# Patient Record
Sex: FEMALE | Race: WHITE | NOT HISPANIC OR LATINO | Employment: FULL TIME | URBAN - METROPOLITAN AREA
[De-identification: names, ages, dates, MRNs, and addresses within clinical notes are randomized per-mention and may not be internally consistent; named-entity substitution may affect disease eponyms.]

---

## 2019-08-27 ENCOUNTER — APPOINTMENT (OUTPATIENT)
Dept: RADIOLOGY | Facility: MEDICAL CENTER | Age: 39
DRG: 389 | End: 2019-08-27
Attending: EMERGENCY MEDICINE
Payer: COMMERCIAL

## 2019-08-27 ENCOUNTER — HOSPITAL ENCOUNTER (INPATIENT)
Facility: MEDICAL CENTER | Age: 39
LOS: 1 days | DRG: 389 | End: 2019-08-28
Attending: EMERGENCY MEDICINE | Admitting: HOSPITALIST
Payer: COMMERCIAL

## 2019-08-27 DIAGNOSIS — R10.84 GENERALIZED ABDOMINAL PAIN: ICD-10-CM

## 2019-08-27 DIAGNOSIS — R11.2 NON-INTRACTABLE VOMITING WITH NAUSEA, UNSPECIFIED VOMITING TYPE: ICD-10-CM

## 2019-08-27 DIAGNOSIS — K56.609 SMALL BOWEL OBSTRUCTION (HCC): Primary | ICD-10-CM

## 2019-08-27 PROBLEM — I10 ESSENTIAL HYPERTENSION: Status: ACTIVE | Noted: 2019-08-27

## 2019-08-27 PROBLEM — Q79.60 EHLERS-DANLOS SYNDROME: Status: ACTIVE | Noted: 2019-08-27

## 2019-08-27 LAB
ALBUMIN SERPL BCP-MCNC: 4.8 G/DL (ref 3.2–4.9)
ALBUMIN/GLOB SERPL: 1.5 G/DL
ALP SERPL-CCNC: 61 U/L (ref 30–99)
ALT SERPL-CCNC: 15 U/L (ref 2–50)
ANION GAP SERPL CALC-SCNC: 14 MMOL/L (ref 0–11.9)
AST SERPL-CCNC: 24 U/L (ref 12–45)
BASOPHILS # BLD AUTO: 0.5 % (ref 0–1.8)
BASOPHILS # BLD: 0.03 K/UL (ref 0–0.12)
BILIRUB SERPL-MCNC: 1.3 MG/DL (ref 0.1–1.5)
BUN SERPL-MCNC: 8 MG/DL (ref 8–22)
CALCIUM SERPL-MCNC: 10 MG/DL (ref 8.5–10.5)
CHLORIDE SERPL-SCNC: 97 MMOL/L (ref 96–112)
CO2 SERPL-SCNC: 25 MMOL/L (ref 20–33)
CREAT SERPL-MCNC: 0.83 MG/DL (ref 0.5–1.4)
EOSINOPHIL # BLD AUTO: 0.01 K/UL (ref 0–0.51)
EOSINOPHIL NFR BLD: 0.2 % (ref 0–6.9)
ERYTHROCYTE [DISTWIDTH] IN BLOOD BY AUTOMATED COUNT: 41.4 FL (ref 35.9–50)
GLOBULIN SER CALC-MCNC: 3.1 G/DL (ref 1.9–3.5)
GLUCOSE SERPL-MCNC: 105 MG/DL (ref 65–99)
HCG SERPL QL: NEGATIVE
HCT VFR BLD AUTO: 44.6 % (ref 37–47)
HGB BLD-MCNC: 15.2 G/DL (ref 12–16)
IMM GRANULOCYTES # BLD AUTO: 0.01 K/UL (ref 0–0.11)
IMM GRANULOCYTES NFR BLD AUTO: 0.2 % (ref 0–0.9)
LIPASE SERPL-CCNC: 10 U/L (ref 11–82)
LYMPHOCYTES # BLD AUTO: 0.99 K/UL (ref 1–4.8)
LYMPHOCYTES NFR BLD: 15.4 % (ref 22–41)
MCH RBC QN AUTO: 31.7 PG (ref 27–33)
MCHC RBC AUTO-ENTMCNC: 34.1 G/DL (ref 33.6–35)
MCV RBC AUTO: 93.1 FL (ref 81.4–97.8)
MONOCYTES # BLD AUTO: 0.37 K/UL (ref 0–0.85)
MONOCYTES NFR BLD AUTO: 5.8 % (ref 0–13.4)
NEUTROPHILS # BLD AUTO: 5.02 K/UL (ref 2–7.15)
NEUTROPHILS NFR BLD: 77.9 % (ref 44–72)
NRBC # BLD AUTO: 0 K/UL
NRBC BLD-RTO: 0 /100 WBC
PLATELET # BLD AUTO: 282 K/UL (ref 164–446)
PMV BLD AUTO: 9.5 FL (ref 9–12.9)
POTASSIUM SERPL-SCNC: 4.2 MMOL/L (ref 3.6–5.5)
PROT SERPL-MCNC: 7.9 G/DL (ref 6–8.2)
RBC # BLD AUTO: 4.79 M/UL (ref 4.2–5.4)
SODIUM SERPL-SCNC: 136 MMOL/L (ref 135–145)
WBC # BLD AUTO: 6.4 K/UL (ref 4.8–10.8)

## 2019-08-27 PROCEDURE — 96374 THER/PROPH/DIAG INJ IV PUSH: CPT

## 2019-08-27 PROCEDURE — 700105 HCHG RX REV CODE 258: Performed by: HOSPITALIST

## 2019-08-27 PROCEDURE — 84703 CHORIONIC GONADOTROPIN ASSAY: CPT

## 2019-08-27 PROCEDURE — 85025 COMPLETE CBC W/AUTO DIFF WBC: CPT

## 2019-08-27 PROCEDURE — 96375 TX/PRO/DX INJ NEW DRUG ADDON: CPT

## 2019-08-27 PROCEDURE — 700102 HCHG RX REV CODE 250 W/ 637 OVERRIDE(OP): Performed by: HOSPITALIST

## 2019-08-27 PROCEDURE — 700111 HCHG RX REV CODE 636 W/ 250 OVERRIDE (IP): Performed by: EMERGENCY MEDICINE

## 2019-08-27 PROCEDURE — 700105 HCHG RX REV CODE 258: Performed by: EMERGENCY MEDICINE

## 2019-08-27 PROCEDURE — 83690 ASSAY OF LIPASE: CPT

## 2019-08-27 PROCEDURE — 770006 HCHG ROOM/CARE - MED/SURG/GYN SEMI*

## 2019-08-27 PROCEDURE — 80053 COMPREHEN METABOLIC PANEL: CPT

## 2019-08-27 PROCEDURE — 99285 EMERGENCY DEPT VISIT HI MDM: CPT

## 2019-08-27 PROCEDURE — 74176 CT ABD & PELVIS W/O CONTRAST: CPT

## 2019-08-27 PROCEDURE — 99223 1ST HOSP IP/OBS HIGH 75: CPT | Performed by: HOSPITALIST

## 2019-08-27 PROCEDURE — 700111 HCHG RX REV CODE 636 W/ 250 OVERRIDE (IP): Performed by: HOSPITALIST

## 2019-08-27 PROCEDURE — A9270 NON-COVERED ITEM OR SERVICE: HCPCS | Performed by: HOSPITALIST

## 2019-08-27 RX ORDER — MORPHINE SULFATE 4 MG/ML
1-4 INJECTION, SOLUTION INTRAMUSCULAR; INTRAVENOUS
Status: DISCONTINUED | OUTPATIENT
Start: 2019-08-27 | End: 2019-08-28 | Stop reason: HOSPADM

## 2019-08-27 RX ORDER — SODIUM CHLORIDE, SODIUM LACTATE, POTASSIUM CHLORIDE, CALCIUM CHLORIDE 600; 310; 30; 20 MG/100ML; MG/100ML; MG/100ML; MG/100ML
INJECTION, SOLUTION INTRAVENOUS CONTINUOUS
Status: DISCONTINUED | OUTPATIENT
Start: 2019-08-27 | End: 2019-08-28

## 2019-08-27 RX ORDER — SODIUM CHLORIDE 9 MG/ML
1000 INJECTION, SOLUTION INTRAVENOUS ONCE
Status: COMPLETED | OUTPATIENT
Start: 2019-08-27 | End: 2019-08-27

## 2019-08-27 RX ORDER — AZELASTINE 1 MG/ML
1 SPRAY, METERED NASAL 2 TIMES DAILY
COMMUNITY

## 2019-08-27 RX ORDER — BISACODYL 10 MG
10 SUPPOSITORY, RECTAL RECTAL
Status: DISCONTINUED | OUTPATIENT
Start: 2019-08-27 | End: 2019-08-28 | Stop reason: HOSPADM

## 2019-08-27 RX ORDER — CLONIDINE 0.1 MG/24H
1 PATCH, EXTENDED RELEASE TRANSDERMAL
COMMUNITY

## 2019-08-27 RX ORDER — PROMETHAZINE HYDROCHLORIDE 25 MG/1
12.5-25 SUPPOSITORY RECTAL EVERY 4 HOURS PRN
Status: DISCONTINUED | OUTPATIENT
Start: 2019-08-27 | End: 2019-08-28 | Stop reason: HOSPADM

## 2019-08-27 RX ORDER — PROMETHAZINE HYDROCHLORIDE 25 MG/1
12.5-25 TABLET ORAL EVERY 4 HOURS PRN
Status: DISCONTINUED | OUTPATIENT
Start: 2019-08-27 | End: 2019-08-28 | Stop reason: HOSPADM

## 2019-08-27 RX ORDER — HYDROMORPHONE HYDROCHLORIDE 1 MG/ML
1 INJECTION, SOLUTION INTRAMUSCULAR; INTRAVENOUS; SUBCUTANEOUS ONCE
Status: COMPLETED | OUTPATIENT
Start: 2019-08-27 | End: 2019-08-27

## 2019-08-27 RX ORDER — POLYETHYLENE GLYCOL 3350 17 G/17G
1 POWDER, FOR SOLUTION ORAL
Status: DISCONTINUED | OUTPATIENT
Start: 2019-08-27 | End: 2019-08-28 | Stop reason: HOSPADM

## 2019-08-27 RX ORDER — ONDANSETRON 4 MG/1
4 TABLET, ORALLY DISINTEGRATING ORAL EVERY 4 HOURS PRN
Status: DISCONTINUED | OUTPATIENT
Start: 2019-08-27 | End: 2019-08-28 | Stop reason: HOSPADM

## 2019-08-27 RX ORDER — NALTREXONE HYDROCHLORIDE 50 MG/1
50 TABLET, FILM COATED ORAL DAILY
COMMUNITY
End: 2019-08-27

## 2019-08-27 RX ORDER — PROCHLORPERAZINE EDISYLATE 5 MG/ML
5-10 INJECTION INTRAMUSCULAR; INTRAVENOUS EVERY 4 HOURS PRN
Status: DISCONTINUED | OUTPATIENT
Start: 2019-08-27 | End: 2019-08-28 | Stop reason: HOSPADM

## 2019-08-27 RX ORDER — PROCHLORPERAZINE EDISYLATE 5 MG/ML
10 INJECTION INTRAMUSCULAR; INTRAVENOUS ONCE
Status: COMPLETED | OUTPATIENT
Start: 2019-08-27 | End: 2019-08-27

## 2019-08-27 RX ORDER — AMOXICILLIN 250 MG
2 CAPSULE ORAL 2 TIMES DAILY
Status: DISCONTINUED | OUTPATIENT
Start: 2019-08-27 | End: 2019-08-28 | Stop reason: HOSPADM

## 2019-08-27 RX ORDER — SODIUM CHLORIDE 9 MG/ML
INJECTION, SOLUTION INTRAVENOUS CONTINUOUS
Status: DISCONTINUED | OUTPATIENT
Start: 2019-08-27 | End: 2019-08-28 | Stop reason: HOSPADM

## 2019-08-27 RX ORDER — ONDANSETRON 2 MG/ML
4 INJECTION INTRAMUSCULAR; INTRAVENOUS EVERY 4 HOURS PRN
Status: DISCONTINUED | OUTPATIENT
Start: 2019-08-27 | End: 2019-08-28 | Stop reason: HOSPADM

## 2019-08-27 RX ORDER — CLONIDINE 0.1 MG/24H
1 PATCH, EXTENDED RELEASE TRANSDERMAL
Status: DISCONTINUED | OUTPATIENT
Start: 2019-08-27 | End: 2019-08-28 | Stop reason: HOSPADM

## 2019-08-27 RX ADMIN — SODIUM CHLORIDE, POTASSIUM CHLORIDE, SODIUM LACTATE AND CALCIUM CHLORIDE: 600; 310; 30; 20 INJECTION, SOLUTION INTRAVENOUS at 14:52

## 2019-08-27 RX ADMIN — PROCHLORPERAZINE EDISYLATE 10 MG: 5 INJECTION INTRAMUSCULAR; INTRAVENOUS at 08:07

## 2019-08-27 RX ADMIN — SODIUM CHLORIDE 1000 ML: 9 INJECTION, SOLUTION INTRAVENOUS at 08:07

## 2019-08-27 RX ADMIN — SODIUM CHLORIDE, POTASSIUM CHLORIDE, SODIUM LACTATE AND CALCIUM CHLORIDE: 600; 310; 30; 20 INJECTION, SOLUTION INTRAVENOUS at 17:10

## 2019-08-27 RX ADMIN — SODIUM CHLORIDE: 9 INJECTION, SOLUTION INTRAVENOUS at 12:06

## 2019-08-27 RX ADMIN — ONDANSETRON 4 MG: 2 INJECTION INTRAMUSCULAR; INTRAVENOUS at 15:03

## 2019-08-27 RX ADMIN — SENNOSIDES, DOCUSATE SODIUM 2 TABLET: 50; 8.6 TABLET, FILM COATED ORAL at 17:04

## 2019-08-27 RX ADMIN — MORPHINE SULFATE 1 MG: 4 INJECTION INTRAVENOUS at 15:03

## 2019-08-27 RX ADMIN — HYDROMORPHONE HYDROCHLORIDE 1 MG: 1 INJECTION, SOLUTION INTRAMUSCULAR; INTRAVENOUS; SUBCUTANEOUS at 08:07

## 2019-08-27 ASSESSMENT — COGNITIVE AND FUNCTIONAL STATUS - GENERAL
MOBILITY SCORE: 24
SUGGESTED CMS G CODE MODIFIER MOBILITY: CH
SUGGESTED CMS G CODE MODIFIER DAILY ACTIVITY: CH
DAILY ACTIVITIY SCORE: 24

## 2019-08-27 ASSESSMENT — LIFESTYLE VARIABLES
HOW MANY TIMES IN THE PAST YEAR HAVE YOU HAD 5 OR MORE DRINKS IN A DAY: 0
EVER HAD A DRINK FIRST THING IN THE MORNING TO STEADY YOUR NERVES TO GET RID OF A HANGOVER: NO
AVERAGE NUMBER OF DAYS PER WEEK YOU HAVE A DRINK CONTAINING ALCOHOL: 0
DOES PATIENT WANT TO STOP DRINKING: NO
ALCOHOL_USE: NO
HAVE PEOPLE ANNOYED YOU BY CRITICIZING YOUR DRINKING: NO
EVER_SMOKED: NEVER
TOTAL SCORE: 0
EVER FELT BAD OR GUILTY ABOUT YOUR DRINKING: NO
CONSUMPTION TOTAL: NEGATIVE
HAVE YOU EVER FELT YOU SHOULD CUT DOWN ON YOUR DRINKING: NO
TOTAL SCORE: 0
TOTAL SCORE: 0
ON A TYPICAL DAY WHEN YOU DRINK ALCOHOL HOW MANY DRINKS DO YOU HAVE: 0

## 2019-08-27 ASSESSMENT — PATIENT HEALTH QUESTIONNAIRE - PHQ9
2. FEELING DOWN, DEPRESSED, IRRITABLE, OR HOPELESS: NOT AT ALL
1. LITTLE INTEREST OR PLEASURE IN DOING THINGS: NOT AT ALL
SUM OF ALL RESPONSES TO PHQ9 QUESTIONS 1 AND 2: 0

## 2019-08-27 ASSESSMENT — ENCOUNTER SYMPTOMS
DIARRHEA: 0
SHORTNESS OF BREATH: 0
VOMITING: 1
ABDOMINAL PAIN: 1
FEVER: 0

## 2019-08-27 NOTE — PROGRESS NOTES
Patient arrived to room via gurney from the ER. Patient ambulatory to bathroom and then to bed. Patient has intact skin. Abdomen is slightly distended and tender to palpation. Patient voiding adequately, however, not passing any gas. Patient is NPO with ice chips. Patient oriented to room, unit routine and call light. Admit profile completed. No other needs at this time.

## 2019-08-27 NOTE — ED NOTES
Med Rec completed per patient   Allergies reviewed  No ORAL antibiotics in last 14 days    Patient has her medications [facility]

## 2019-08-27 NOTE — CONSULTS
DATE OF SERVICE:  08/27/2019    SURGICAL CONSULTATION    HISTORY OF PRESENT ILLNESS:  The patient is a 39-year-old woman who I have   been asked to evaluate further by Dr. Carrillo for bowel obstruction.  She has a   history of multiple bowel obstructions and has a history of multiple previous   abdominal operations.  Her last obstruction was 1 year ago.  She developed   abdominal discomfort last evening and had progressive increase in her pain.    She had some associated nausea and 2 episodes of emesis.  She does state that   she had a bowel movement yesterday afternoon.  She states that since she was   admitted here, she has felt a significant improvement in her pain.  She has   not had any recurrent nausea or emesis.  She has refused an NG tube here in   the emergency room.  She has a medical history, which is significant for   interstitial cystitis and previous bowel obstructions as well as a Chiari   malformation.    PAST SURGICAL HISTORY:  She has had multiple abdominal operations in the past.    MEDICATIONS:  Prior to this admission include Catapres patch, magnesium   chloride, and Depade.    ALLERGIES:  SHE HAS ALLERGY TO VICODIN AND CONTRAST IODINE.    SOCIAL HISTORY:  She does not drink, smoke, or use illicit drugs.    FAMILY HISTORY:  Essentially negative.    REVIEW OF SYSTEMS:  Good health prior to this until the acute onset of pain   one day ago.  Otherwise, negative per AMA and CMS criteria.    PHYSICAL EXAMINATION:  VITAL SIGNS:  She currently has temperature of 37, pulse 92, blood pressure   120/68.  GENERAL:  She is lying in bed and is comfortable.  She is in no distress.  HEENT:  Unremarkable.  Pupils are equal.  Oropharynx is without lesions.  NECK:  Supple.  LUNGS:  Clear.  CARDIOVASCULAR:  Reveals regular rate and rhythm.  ABDOMEN:  Soft and not really tender to palpation.  EXTREMITIES:  Symmetrical without clubbing, cyanosis, or edema.  SKIN:  Warm and dry.  She has palpable radial, femoral, and  pedal pulses.  NEUROLOGIC:  She is neurologically intact without any gross detectable motor   or sensory deficits.    IMAGING:  She had a CT scan, which did show dilated segment of bowel with a   small amount of free fluid and slight edema in the mesentery, no free air.    IMPRESSION:  A 39-year-old woman with recurrent bowel obstruction.  She has a   history of previous bowel obstructions.  Clinically, she does not have any   evidence of mesenteric ischemia, is relatively comfortable.  She is an   appropriate candidate for initial nonoperative management in hopes of   resolution without operative intervention.  I discussed this plan with her at   length including the fact that the standard of care for nonoperative   management including an NG tube.  She is well aware of this, but at this time   declines an NG tube.  She understands that this may increase the failure rate   for nonoperative management.  I will follow her along.       ____________________________________     MD HALIE YOUNG / KIA    DD:  08/27/2019 14:24:54  DT:  08/27/2019 14:40:04    D#:  [id number]  Job#:  [id number]

## 2019-08-27 NOTE — ED TRIAGE NOTES
"Chief Complaint   Patient presents with   • N/V     Patient ambulatory to triage concerned for SBO, states she has had them before and it feels similar. Symptoms started approx 1700 last night, 2 epsiodes of vomiting last night, last bm last night. c/o 7 out of 10 pain, hoping to have IV fluids and zofran    • Abdominal Pain     Patient appears uncomfortable in triage, rocking in chair, moaning in pain. Educated on ed triage process, instructed to notify staff of any new or worsening conditions. Informed of NPO status. Patient placed in [facility].   /84   Pulse 97   Temp 37 °C (98.6 °F) (Temporal)   Resp 18   Ht 1.651 m (5' 5\")   Wt 60.3 kg (132 lb 15 oz)   SpO2 100%     "

## 2019-08-27 NOTE — ED PROVIDER NOTES
"ED Provider Note    CHIEF COMPLAINT  Chief Complaint   Patient presents with   • N/V     Patient ambulatory to triage concerned for SBO, states she has had them before and it feels similar. Symptoms started approx 1700 last night, 2 epsiodes of vomiting last night, last bm last night. c/o 7 out of 10 pain, hoping to have IV fluids and zofran    • Abdominal Pain       HPI  Toña King is a 39 y.o. female who presents to the emergency department with abdominal pain.  Patient states she is traveling here for work from out of town, lives in [place].  History of previous small bowel obstructions after multiple abdominal surgeries.  Patient states onset of abdominal discomfort yesterday evening, 5 PM \"I thought it was just gas at first,\" but progressive discomfort, nearly intractable at this time.  \"Comes in waves,\" but constant discomfort.  10 out of 10, sharp, cramping during this evaluation.  Nausea with 2 episodes of vomiting.  Patient did have normal bowel movement twice yesterday afternoon.  No chest pain, shortness of breath.  No back pain.  No dysuria, hematuria, frequency.    REVIEW OF SYSTEMS  See HPI for further details. All other systems are negative.     PAST MEDICAL HISTORY   has a past medical history of Interstitial cystitis and SBO (small bowel obstruction) (HCC).   Chiari malformation.  Airless Danlos.    SOCIAL HISTORY  Social History     Tobacco Use   • Smoking status: Never Smoker   • Smokeless tobacco: Never Used   Substance and Sexual Activity   • Alcohol use: Not Currently   • Drug use: Never   • Sexual activity: Not on file       SURGICAL HISTORY  patient denies any surgical history    CURRENT MEDICATIONS  Home Medications     Reviewed by Michell Gomez R.N. (Registered Nurse) on 08/27/19 at 0644  Med List Status: Complete   Medication Last Dose Status   cloNIDine (CATAPRES) 0.1 MG/24HR PATCH WEEKLY patch  Active   magnesium chloride (MAG-64) 64 MG Tablet Delayed Response  Active " "  naltrexone (DEPADE) 50 MG Tab  Active                ALLERGIES  Allergies   Allergen Reactions   • Contrast Media With Iodine [Iodine] Anaphylaxis   • Vicodin [Hydrocodone-Acetaminophen] Vomiting       PHYSICAL EXAM  VITAL SIGNS: /84   Pulse 97   Temp 37 °C (98.6 °F) (Temporal)   Resp 18   Ht 1.651 m (5' 5\")   Wt 60.3 kg (132 lb 15 oz)   SpO2 100%   BMI 22.12 kg/m²   Pulse ox interpretation: I interpret this pulse ox as normal.  Constitutional: Alert.  Tearful, moaning, moderate distress secondary discomfort.  HENT: Normocephalic, atraumatic. Bilateral external ears normal, Nose normal.  Dry mucous membranes.    Eyes: Pupils are equal and reactive, Conjunctiva normal.   Neck: Normal range of motion, Supple  Lymphatic: No lymphadenopathy noted.   Cardiovascular: Regular rate and rhythm, no murmurs. Distal pulses intact.    Thorax & Lungs: Normal breath sounds.  No wheezing/rales/ronchi. No increased work of breathing  Abdomen: Soft, non-distended.  Diffuse tenderness palpation, voluntary guarding.  No peritonitis.  No palpable pulsatile mass.  Skin: Warm, Dry, No erythema, No rash.   Musculoskeletal: Good range of motion in all major joints.  Neurologic: Alert and oriented x4.  Moves 4 extremity spontaneously.  Psychiatric: Anxious, tearful.  Cooperative.      DIAGNOSTIC STUDIES / PROCEDURES    LABS  Results for orders placed or performed during the hospital encounter of 08/27/19   CBC WITH DIFFERENTIAL   Result Value Ref Range    WBC 6.4 4.8 - 10.8 K/uL    RBC 4.79 4.20 - 5.40 M/uL    Hemoglobin 15.2 12.0 - 16.0 g/dL    Hematocrit 44.6 37.0 - 47.0 %    MCV 93.1 81.4 - 97.8 fL    MCH 31.7 27.0 - 33.0 pg    MCHC 34.1 33.6 - 35.0 g/dL    RDW 41.4 35.9 - 50.0 fL    Platelet Count 282 164 - 446 K/uL    MPV 9.5 9.0 - 12.9 fL    Neutrophils-Polys 77.90 (H) 44.00 - 72.00 %    Lymphocytes 15.40 (L) 22.00 - 41.00 %    Monocytes 5.80 0.00 - 13.40 %    Eosinophils 0.20 0.00 - 6.90 %    Basophils 0.50 0.00 - 1.80 " %    Immature Granulocytes 0.20 0.00 - 0.90 %    Nucleated RBC 0.00 /100 WBC    Neutrophils (Absolute) 5.02 2.00 - 7.15 K/uL    Lymphs (Absolute) 0.99 (L) 1.00 - 4.80 K/uL    Monos (Absolute) 0.37 0.00 - 0.85 K/uL    Eos (Absolute) 0.01 0.00 - 0.51 K/uL    Baso (Absolute) 0.03 0.00 - 0.12 K/uL    Immature Granulocytes (abs) 0.01 0.00 - 0.11 K/uL    NRBC (Absolute) 0.00 K/uL   COMP METABOLIC PANEL   Result Value Ref Range    Sodium 136 135 - 145 mmol/L    Potassium 4.2 3.6 - 5.5 mmol/L    Chloride 97 96 - 112 mmol/L    Co2 25 20 - 33 mmol/L    Anion Gap 14.0 (H) 0.0 - 11.9    Glucose 105 (H) 65 - 99 mg/dL    Bun 8 8 - 22 mg/dL    Creatinine 0.83 0.50 - 1.40 mg/dL    Calcium 10.0 8.5 - 10.5 mg/dL    AST(SGOT) 24 12 - 45 U/L    ALT(SGPT) 15 2 - 50 U/L    Alkaline Phosphatase 61 30 - 99 U/L    Total Bilirubin 1.3 0.1 - 1.5 mg/dL    Albumin 4.8 3.2 - 4.9 g/dL    Total Protein 7.9 6.0 - 8.2 g/dL    Globulin 3.1 1.9 - 3.5 g/dL    A-G Ratio 1.5 g/dL   LIPASE   Result Value Ref Range    Lipase 10 (L) 11 - 82 U/L   HCG QUAL SERUM   Result Value Ref Range    Beta-Hcg Qualitative Serum Negative Negative   ESTIMATED GFR   Result Value Ref Range    GFR If African American >60 >60 mL/min/1.73 m 2    GFR If Non African American >60 >60 mL/min/1.73 m 2       RADIOLOGY  CT-RENAL COLIC EVALUATION(A/P W/O)   Final Result      1.  Mid small bowel obstruction      2.  Small free fluid, small mesenteric fluid. No free air      Findings were discussed with MANINDER MCGEE on 8/27/2019 10:32 AM.          COURSE & MEDICAL DECISION MAKING  Nursing notes and vital signs were reviewed. (See chart for details)  The patients records were reviewed, history was obtained from the patient;    10:32 AM Dr. Martini, radiology, CT findings as above.  General surgery paged.  NG tube ordered.    10:44 AM Dr. Zuleta is aware of the patient and agreeable to consultation.  Requests hospitalist admission.    10:53 AM patient refusing NG tube.  Otherwise states  "pain is controlled since initial Dilaudid.  Continue maintenance fluid.    10:55 AM Dr. Rivas is aware of the patient and agreeable to admission.     ED evaluation for sudden onset abdominal pain, nausea, vomiting does demonstrate a small bowel obstruction.  Patient has history of the same.  Pain is controlled with Dilaudid, no further vomiting since Compazine.  Hemodynamically stable without fever, tachycardia or hypotension.  Abdomen is tender but without peritonitis or acute abdomen.  Labs are quite unrevealing, no electrolyte derangement.  Patient refusing NG tube, \"I have not needed them for the last couple of obstructions and they told me they do not do that anymore,\" otherwise stable and cooperative.  She will be admitted to hospitalist with close surgical consultation.  She is aware the findings and agreeable to the disposition plan.      FINAL IMPRESSION  (K56.609) Small bowel obstruction (HCC)  (primary encounter diagnosis)  (R11.2) Non-intractable vomiting with nausea, unspecified vomiting type  (R10.84) Generalized abdominal pain      Electronically signed by: Ashley Carrillo, 8/27/2019 7:58 AM      This dictation was created using voice recognition software. The accuracy of the dictation is limited to the abilities of the software. I expect there may be some errors of grammar and possibly content. The nursing notes were reviewed and certain aspects of this information were incorporated into this note.        "

## 2019-08-27 NOTE — CARE PLAN
Problem: Knowledge Deficit  Goal: Knowledge of the prescribed therapeutic regimen will improve  Outcome: PROGRESSING AS EXPECTED  Patient educated on room, unit routine and call light. Medications explained. Patient has had a bowel obstruction prior.    Problem: Bowel/Gastric:  Goal: Normal bowel function is maintained or improved  Outcome: PROGRESSING SLOWER THAN EXPECTED  Patient had bowel movements yesterday, but is distended today and not passing gas.

## 2019-08-28 ENCOUNTER — PATIENT OUTREACH (OUTPATIENT)
Dept: HEALTH INFORMATION MANAGEMENT | Facility: OTHER | Age: 39
End: 2019-08-28

## 2019-08-28 VITALS
WEIGHT: 132.94 LBS | TEMPERATURE: 98.2 F | DIASTOLIC BLOOD PRESSURE: 68 MMHG | OXYGEN SATURATION: 92 % | RESPIRATION RATE: 14 BRPM | BODY MASS INDEX: 22.15 KG/M2 | SYSTOLIC BLOOD PRESSURE: 107 MMHG | HEART RATE: 67 BPM | HEIGHT: 65 IN

## 2019-08-28 LAB
ANION GAP SERPL CALC-SCNC: 6 MMOL/L (ref 0–11.9)
BUN SERPL-MCNC: 9 MG/DL (ref 8–22)
CALCIUM SERPL-MCNC: 8.3 MG/DL (ref 8.5–10.5)
CHLORIDE SERPL-SCNC: 106 MMOL/L (ref 96–112)
CO2 SERPL-SCNC: 24 MMOL/L (ref 20–33)
CREAT SERPL-MCNC: 0.71 MG/DL (ref 0.5–1.4)
GLUCOSE SERPL-MCNC: 94 MG/DL (ref 65–99)
POTASSIUM SERPL-SCNC: 3.7 MMOL/L (ref 3.6–5.5)
SODIUM SERPL-SCNC: 136 MMOL/L (ref 135–145)

## 2019-08-28 PROCEDURE — 80048 BASIC METABOLIC PNL TOTAL CA: CPT

## 2019-08-28 PROCEDURE — 700105 HCHG RX REV CODE 258: Performed by: HOSPITALIST

## 2019-08-28 PROCEDURE — 36415 COLL VENOUS BLD VENIPUNCTURE: CPT

## 2019-08-28 PROCEDURE — 99239 HOSP IP/OBS DSCHRG MGMT >30: CPT | Performed by: HOSPITALIST

## 2019-08-28 RX ORDER — ONDANSETRON 4 MG/1
4 TABLET, ORALLY DISINTEGRATING ORAL EVERY 4 HOURS PRN
Qty: 10 TAB | Refills: 0 | Status: SHIPPED | OUTPATIENT
Start: 2019-08-28

## 2019-08-28 RX ADMIN — SODIUM CHLORIDE, POTASSIUM CHLORIDE, SODIUM LACTATE AND CALCIUM CHLORIDE: 600; 310; 30; 20 INJECTION, SOLUTION INTRAVENOUS at 08:48

## 2019-08-28 RX ADMIN — SODIUM CHLORIDE, POTASSIUM CHLORIDE, SODIUM LACTATE AND CALCIUM CHLORIDE: 600; 310; 30; 20 INJECTION, SOLUTION INTRAVENOUS at 00:30

## 2019-08-28 ASSESSMENT — ENCOUNTER SYMPTOMS
ABDOMINAL PAIN: 0
NAUSEA: 0
VOMITING: 0

## 2019-08-28 NOTE — DISCHARGE INSTRUCTIONS
Discharge Instructions    Discharged to home by car with friend. Discharged via walking, hospital escort: Refused.  Special equipment needed: Not Applicable    Be sure to schedule a follow-up appointment with your primary care doctor or any specialists as instructed.     Discharge Plan:   Diet Plan: Discussed  Activity Level: Discussed  Confirmed Follow up Appointment: Patient to Call and Schedule Appointment  Confirmed Symptoms Management: Discussed  Medication Reconciliation Updated: Yes  Influenza Vaccine Indication: Indicated: Not available from distributor/    I understand that a diet low in cholesterol, fat, and sodium is recommended for good health. Unless I have been given specific instructions below for another diet, I accept this instruction as my diet prescription.   Other diet: Regular as tolerated    Special Instructions: None    · Is patient discharged on Warfarin / Coumadin?   No     Depression / Suicide Risk    As you are discharged from this [facility] facility, it is important to learn how to keep safe from harming yourself.    Recognize the warning signs:  · Abrupt changes in personality, positive or negative- including increase in energy   · Giving away possessions  · Change in eating patterns- significant weight changes-  positive or negative  · Change in sleeping patterns- unable to sleep or sleeping all the time   · Unwillingness or inability to communicate  · Depression  · Unusual sadness, discouragement and loneliness  · Talk of wanting to die  · Neglect of personal appearance   · Rebelliousness- reckless behavior  · Withdrawal from people/activities they love  · Confusion- inability to concentrate     If you or a loved one observes any of these behaviors or has concerns about self-harm, here's what you can do:  · Talk about it- your feelings and reasons for harming yourself  · Remove any means that you might use to hurt yourself (examples: pills, rope, extension cords,  firearm)  · Get professional help from the community (Mental Health, Substance Abuse, psychological counseling)  · Do not be alone:Call your Safe Contact- someone whom you trust who will be there for you.  · Call your local CRISIS HOTLINE 119-2599 or 085-960-7280  · Call your local Children's Mobile Crisis Response Team [place] (159) 640-1081 or www.[employer]  · Call the toll free National Suicide Prevention Hotlines   · National Suicide Prevention Lifeline 277-882-TSFR (3702)  · [employer] Hope Line Network 800-SUICIDE (844-1442)      Discharge Instructions per Sedrick Patel M.D.        DIET: regular     ACTIVITY: as tolerated    DIAGNOSIS: small bowel obstruction    Return to ER if nausea vomiting abdominal pain, fever, chills, shortness of breath.         Small Bowel Obstruction  A small bowel obstruction is a blockage in the small bowel. The small bowel, which is also called the small intestine, is a long, slender tube that connects the stomach to the colon. When a person eats and drinks, food and fluids go from the stomach to the small bowel. This is where most of the nutrients in the food and fluids are absorbed.  A small bowel obstruction will prevent food and fluids from passing through the small bowel as they normally do during digestion. The small bowel can become partially or completely blocked. This can cause symptoms such as abdominal pain, vomiting, and bloating. If this condition is not treated, it can be dangerous because the small bowel could rupture.  What are the causes?  Common causes of this condition include:  · Scar tissue from previous surgery or radiation treatment.  · Recent surgery. This may cause the movements of the bowel to slow down and cause food to block the intestine.  · Hernias.  · Inflammatory bowel disease (colitis).  · Twisting of the bowel (volvulus).  · Tumors.  · A foreign body.  · Slipping of a part of the bowel into another part (intussusception).  What  are the signs or symptoms?  Symptoms of this condition include:  · Abdominal pain. This may be dull cramps or sharp pain. It may occur in one area, or it may be present in the entire abdomen. Pain can range from mild to severe, depending on the degree of obstruction.  · Nausea and vomiting. Vomit may be greenish or a yellow bile color.  · Abdominal bloating.  · Constipation.  · Lack of passing gas.  · Frequent belching.  · Diarrhea. This may occur if the obstruction is partial and runny stool is able to leak around the obstruction.  How is this diagnosed?  This condition may be diagnosed based on a physical exam, medical history, and X-rays of the abdomen. You may also have other tests, such as a CT scan of the abdomen and pelvis.  How is this treated?  Treatment for this condition depends on the cause and severity of the problem. Treatment options may include:  · Bed rest along with fluids and pain medicines that are given through an IV tube inserted into one of your veins. Sometimes, this is all that is needed for the obstruction to improve.  · Following a simple diet. In some cases, a clear liquid diet may be required for several days. This allows the bowel to rest.  · Placement of a small tube (nasogastric tube) into the stomach. When the bowel is blocked, it usually swells up like a balloon that is filled with air and fluids. The air and fluids may be removed by suction through the nasogastric tube. This can help with pain, discomfort, and nausea. It can also help the obstruction to clear up faster.  · Surgery. This may be required if other treatments do not work. Bowel obstruction from a hernia may require early surgery and can be an emergency procedure. Surgery may also be required for scar tissue that causes frequent or severe obstructions.  Follow these instructions at home:  · Get plenty of rest.  · Follow instructions from your health care provider about eating restrictions. You may need to avoid solid  foods and consume only clear liquids until your condition improves.  · Take over-the-counter and prescription medicines only as told by your health care provider.  · Keep all follow-up visits as told by your health care provider. This is important.  Contact a health care provider if:  · You have a fever.  · You have chills.  Get help right away if:  · You have increased pain or cramping.  · You vomit blood.  · You have uncontrolled vomiting or nausea.  · You cannot drink fluids because of vomiting or pain.  · You develop confusion.  · You begin feeling very dry or thirsty (dehydrated).  · You have severe bloating.  · You feel extremely weak or you faint.  This information is not intended to replace advice given to you by your health care provider. Make sure you discuss any questions you have with your health care provider.  Document Released: 03/06/2007 Document Revised: 08/14/2017 Document Reviewed: 02/11/2016  [employer] Interactive Patient Education © 2017 [employer]

## 2019-08-28 NOTE — DISCHARGE PLANNING
Care Transition Team Assessment    Psychosocial Assessment Complete.  The patient verified the accuracy of the demographic information in the Facesheet.  Patient is a resident of [place] and is in [place] for a business trip.    The patient reports her boss is in town and will be transporting her home when she is medically clear.    Patient lives at home with her  who is able to assist in her ADLS.    At this time the anticipated discharge plan is home with no post acute needs.      Information Source  Orientation : Oriented x 4  Information Given By: Patient  Informant's Name: (Toña)  Who is responsible for making decisions for patient? : Patient    Readmission Evaluation  Is this a readmission?: No    Elopement Risk  Legal Hold: No  Ambulatory or Self Mobile in Wheelchair: Yes  Disoriented: No  Psychiatric Symptoms: None  History of Wandering: No  Elopement this Admit: No  Vocalizing Wanting to Leave: No  Displays Behaviors, Body Language Wanting to Leave: No-Not at Risk for Elopement  Elopement Risk: Not at Risk for Elopement    Interdisciplinary Discharge Planning  Patient or legal guardian wants to designate a caregiver (see row info): No    Discharge Preparedness  What is your plan after discharge?: Home with help  What are your discharge supports?: Spouse  Prior Functional Level: Independent with Activities of Daily Living    Functional Assesment  Prior Functional Level: Independent with Activities of Daily Living    Finances  Financial Barriers to Discharge: No  Prescription Coverage: Yes    Vision / Hearing Impairment  Vision Impairment : No  Hearing Impairment : No         Advance Directive  Advance Directive?: None  Advance Directive offered?: AD Booklet refused    Domestic Abuse  Have you ever been the victim of abuse or violence?: No  Physical Abuse or Sexual Abuse: No  Verbal Abuse or Emotional Abuse: No  Possible Abuse Reported to:: Not Applicable    Psychological Assessment  History of  Substance Abuse: None  History of Psychiatric Problems: No    Discharge Risks or Barriers  Discharge risks or barriers?: No    Anticipated Discharge Information  Anticipated discharge disposition: Home  Discharge Address: ([address])

## 2019-08-28 NOTE — DISCHARGE SUMMARY
Discharge Summary    CHIEF COMPLAINT ON ADMISSION  Chief Complaint   Patient presents with   • N/V     Patient ambulatory to triage concerned for SBO, states she has had them before and it feels similar. Symptoms started approx 1700 last night, 2 epsiodes of vomiting last night, last bm last night. c/o 7 out of 10 pain, hoping to have IV fluids and zofran    • Abdominal Pain       Reason for Admission  Abdominal pain; Vomiting     Admission Date  8/27/2019    CODE STATUS  Full Code    HPI & HOSPITAL COURSE  Please see original H&P and consult note for specific information, patient was admitted due to SBO patient has h/o sbo in the past that resolved with conservative management, general surgery consulted, patient recovered and was feeling much better she is been ambulating she was started on diet by surgery and she tolerated well w/o N/V patient started passing gas, she is taking a flight back to [place] today [place], since patient is feeling much better started passing gas and tolerating diet she is stable for dc she needs to f/u with her PCP  ASAP, discussed with gen surgery that also cleared patient for dc.   Patient is alert oriented follows commands she will be dc in stable condition.        Therefore, she is discharged in good and stable condition to home with close outpatient follow-up.    The patient recovered much more quickly than anticipated on admission.    Discharge Date  8/28/2019    FOLLOW UP ITEMS POST DISCHARGE  PCP  asap    DISCHARGE DIAGNOSES  Principal Problem:    SBO (small bowel obstruction) (HCC) POA: Yes  Active Problems:    Pura-Danlos syndrome POA: Yes    Essential hypertension POA: Yes  Resolved Problems:    * No resolved hospital problems. *      FOLLOW UP  No future appointments.  Primary Care Provider       Please call and schedule an appointment once back home in [place].Thank you      MEDICATIONS ON DISCHARGE     Medication List      START taking these medications       Instructions   ondansetron 4 MG Tbdp  Commonly known as:  ZOFRAN ODT   Take 1 Tab by mouth every four hours as needed for Nausea.  Dose:  4 mg        CONTINUE taking these medications      Instructions   azelastine 137 MCG/SPRAY nasal spray  Commonly known as:  ASTELIN   Spray 1 Spray in nose 2 times a day.  Dose:  1 Spray     cloNIDine 0.1 MG/24HR Ptwk patch  Commonly known as:  CATAPRES   Apply 1 Patch to skin as directed every 7 days. MONDAY  Dose:  1 Patch     NON SPECIFIED   Apply 1 Dose to affected area(s) every day. Compound  Magnesium Sulf 20 %  Lipoderm   Use 6 clicks daily (1.5 oh=919 mg)  Applies both legs  Dose:  1 Dose     NON SPECIFIED   Apply 1 Application to affected area(s) every bedtime. Compound  Naltrexone 4mg/ml Lipoderm  4 clicks (1ml=4mg)  Dose:  1 Application            Allergies  Allergies   Allergen Reactions   • Contrast Media With Iodine [Iodine] Anaphylaxis   • Vicodin [Hydrocodone-Acetaminophen] Vomiting       DIET  Orders Placed This Encounter   Procedures   • Diet Order Regular     Standing Status:   Standing     Number of Occurrences:   1     Order Specific Question:   Diet:     Answer:   Regular [1]       ACTIVITY  As tolerated.  Weight bearing as tolerated    CONSULTATIONS  gen surgery    PROCEDURES  none    LABORATORY  Lab Results   Component Value Date    SODIUM 136 08/28/2019    POTASSIUM 3.7 08/28/2019    CHLORIDE 106 08/28/2019    CO2 24 08/28/2019    GLUCOSE 94 08/28/2019    BUN 9 08/28/2019    CREATININE 0.71 08/28/2019        Lab Results   Component Value Date    WBC 6.4 08/27/2019    HEMOGLOBIN 15.2 08/27/2019    HEMATOCRIT 44.6 08/27/2019    PLATELETCT 282 08/27/2019        Total time of the discharge process exceeds 37 minutes.

## 2019-08-28 NOTE — PROGRESS NOTES
Bedside report received.  Assessment complete.  A&O x 4. Patient calls appropriately.  Patient up self. Steady gait.  Patient has 3/10 pain. Declines pain medication at this time.  Denies N&V. NPO.  + void  Patient denies SOB.    Review plan with of care with patient. Call light and personal belongings with in reach. Hourly rounding in place. All needs met at this time.

## 2019-08-28 NOTE — PROGRESS NOTES
Discharge paperwork discussed and signed. All questions answered. Patient verbalized worsening symptoms and when to return to ER or call MD. No prescriptions given.

## 2019-08-28 NOTE — ASSESSMENT & PLAN NOTE
small bowel obstruction noted on CAT scan  Dr. Zuleta, surgery, has been consulted  She will be medically managed with IV fluids, IV antiemetics, and IV pain medications.  This is approximately her eighth episode of small bowel obstruction she has required a laparotomy once during these events.  She has historically refused a NG tube and has improved without it and she refuses NG tube in the emergency room.  She will be kept n.p.o.

## 2019-08-28 NOTE — PROGRESS NOTES
Bedside report received.  Assessment complete.  A&O x 4. Patient calls appropriately.  Patient up self. Steady gait.  Patient has 3/10 pain. Declines pain medication at this time.  Denies N&V. Tolerating *** diet.  Surgical ***.  *** void, *** flatus, *** BM.  Patient denies SOB.  SCD's ***.  Patient ***.  Review plan with of care with patient. Call light and personal belongings with in reach. Hourly rounding in place. All needs met at this time.

## 2019-08-28 NOTE — PROGRESS NOTES
Surgical Progress Note    Author: Tyrone SIMPSON Alycegee Date & Time created: 2019   11:08 AM     Interval Events:  Feels well  Ambulating  Tolerating clears  No pain  No flatus  Review of Systems   Gastrointestinal: Negative for abdominal pain, nausea and vomiting.     Hemodynamics:  Temp (24hrs), Av.7 °C (98 °F), Min:36.2 °C (97.2 °F), Max:37.6 °C (99.7 °F)  Temperature: 36.8 °C (98.2 °F)  Pulse  Av  Min: 66  Max: 97   Blood Pressure: 107/68     Respiratory:    Respiration: 14, Pulse Oximetry: 92 %           Neuro:  GCS       Fluids:    Intake/Output Summary (Last 24 hours) at 2019 1108  Last data filed at 2019 0325  Gross per 24 hour   Intake 500 ml   Output --   Net 500 ml        Current Diet Order   Procedures   • Diet NPO     Physical Exam   Constitutional: She is oriented to person, place, and time. She appears well-developed and well-nourished. No distress.   HENT:   Head: Normocephalic and atraumatic.   Eyes: Pupils are equal, round, and reactive to light. EOM are normal. No scleral icterus.   Neck: Normal range of motion. Neck supple. No tracheal deviation present.   Cardiovascular: Normal rate, regular rhythm and normal heart sounds.   Pulmonary/Chest: Effort normal and breath sounds normal. No respiratory distress.   Abdominal: Soft. Bowel sounds are normal. She exhibits no distension. There is no tenderness.   Musculoskeletal: Normal range of motion. She exhibits no edema or deformity.   Neurological: She is alert and oriented to person, place, and time. No cranial nerve deficit.   Skin: Skin is warm and dry. No erythema.     Labs:  Recent Results (from the past 24 hour(s))   Basic Metabolic Panel (BMP)    Collection Time: 19  3:48 AM   Result Value Ref Range    Sodium 136 135 - 145 mmol/L    Potassium 3.7 3.6 - 5.5 mmol/L    Chloride 106 96 - 112 mmol/L    Co2 24 20 - 33 mmol/L    Glucose 94 65 - 99 mg/dL    Bun 9 8 - 22 mg/dL    Creatinine 0.71 0.50 - 1.40 mg/dL    Calcium 8.3  (L) 8.5 - 10.5 mg/dL    Anion Gap 6.0 0.0 - 11.9   ESTIMATED GFR    Collection Time: 08/28/19  3:48 AM   Result Value Ref Range    GFR If African American >60 >60 mL/min/1.73 m 2    GFR If Non African American >60 >60 mL/min/1.73 m 2     Medical Decision Making, by Problem:  Active Hospital Problems    Diagnosis   • SBO (small bowel obstruction) (HCC) [K56.609]   • Pura-Danlos syndrome [Q79.6]   • Essential hypertension [I10]     Plan:  Advance diet    Quality Measures:  Quality-Core Measures    Discussed patient condition with Hospitalist, Family and Patient

## 2019-08-28 NOTE — H&P
"Hospital Medicine History & Physical Note    Date of Service  8/27/2019    Primary Care Physician  No primary care provider on file.    Consultants  Dr. Zlueta, surgery    Code Status  full    Chief Complaint  Abdominal pain    History of Presenting Illness  39 y.o. female who presented 8/27/2019 with abdominal pain and vomiting. Ms. King has a past medical history of Pura-Danlos syndrome, hypertension, nation, and multiple episodes of small bowel obstructions perhaps 7-8 in the past that is in [place] for a business trip.  About 5:00 last night \"I thought I was just having gas\" she felt pain in the right upper quadrant that was crampy nature.  She states by 10:00 \"I was just miserable\" and effectively her abdominal pain and cramping worsened throughout the night.  She vomited twice.  Her last bowel movement was last night it was normal though she has not passed any gas today.  She presented the emergency with these complaints is found to have small bowel obstruction and will be admitted for IV fluids, IV antiemetics, and n.p.o. Status.  In the emergency room, she has deferred an NG tube as she states that she has historically resolved without one in the past those once she did receive a laparotomy for lysis of adhesions.    Review of Systems  Review of Systems   Constitutional: Negative for fever.   Respiratory: Negative for shortness of breath.    Cardiovascular: Negative for chest pain.   Gastrointestinal: Positive for abdominal pain and vomiting. Negative for diarrhea.   All other systems reviewed and are negative.      Past Medical History   has a past medical history of Interstitial cystitis and SBO (small bowel obstruction) (HCC).  Hypertension on clonidine patch Chiari malformation and Pura-Danlos syndrome    Surgical History  Hysterectomy, appendectomy, ovarian rupture with removal of ovary, laparotomy due to small bowel obstruction    Family History  Parents are alive and healthy    Social History   " reports that she has never smoked. She has never used smokeless tobacco. She reports that she drank alcohol. She reports that she does not use drugs.  She lives in [facility] with her     Allergies  Allergies   Allergen Reactions   • Contrast Media With Iodine [Iodine] Anaphylaxis   • Vicodin [Hydrocodone-Acetaminophen] Vomiting       Medications  Prior to Admission Medications   Prescriptions Last Dose Informant Patient Reported? Taking?   NON SPECIFIED 8/26/2019 at AM Rx Bottle (For Med Information) Yes Yes   Sig: Apply 1 Dose to affected area(s) every day. Compound  Magnesium Sulf 20 %  Lipoderm   Use 6 clicks daily (1.5 zr=559 mg)  Applies both legs   NON SPECIFIED 8/25/2019 at pm Rx Bottle (For Med Information) Yes Yes   Sig: Apply 1 Application to affected area(s) every bedtime. Compound  Naltrexone 4mg/ml Lipoderm  4 clicks (1ml=4mg)   azelastine (ASTELIN) 137 MCG/SPRAY nasal spray 8/26/2019 at AM Rx Bottle (For Med Information) Yes Yes   Sig: Spray 1 Spray in nose 2 times a day.   cloNIDine (CATAPRES) 0.1 MG/24HR PATCH WEEKLY patch 8/26/2019 at ON Rx Bottle (For Med Information) Yes Yes   Sig: Apply 1 Patch to skin as directed every 7 days. MONDAY      Facility-Administered Medications: None       Physical Exam  Temp:  [37 °C (98.6 °F)-37.6 °C (99.7 °F)] 37.6 °C (99.7 °F)  Pulse:  [70-97] 77  Resp:  [16-18] 16  BP: (102-120)/(55-84) 116/72  SpO2:  [93 %-100 %] 98 %    Physical Exam   Constitutional: She is oriented to person, place, and time. No distress.   She is thin   HENT:   Head: Normocephalic and atraumatic.   Dry mucous membranes   Eyes: Right eye exhibits no discharge. Left eye exhibits no discharge. No scleral icterus.   Neck: Normal range of motion. Neck supple.   Cardiovascular: Normal rate and regular rhythm.   No murmur heard.  Pulmonary/Chest: Effort normal.   Abdominal:   Abdomen is soft, I do not hear any bowel sounds after 15 seconds of listening.  She is tenderness in the right  upper quadrant without rebound   Musculoskeletal: She exhibits no edema or tenderness.   Neurological: She is alert and oriented to person, place, and time.   Skin: Skin is warm and dry. No rash noted. She is not diaphoretic.   Psychiatric: She has a normal mood and affect. Her behavior is normal.   Nursing note and vitals reviewed.      Laboratory:  Recent Labs     08/27/19 0714   WBC 6.4   RBC 4.79   HEMOGLOBIN 15.2   HEMATOCRIT 44.6   MCV 93.1   MCH 31.7   MCHC 34.1   RDW 41.4   PLATELETCT 282   MPV 9.5     Recent Labs     08/27/19  0714   SODIUM 136   POTASSIUM 4.2   CHLORIDE 97   CO2 25   GLUCOSE 105*   BUN 8   CREATININE 0.83   CALCIUM 10.0     Recent Labs     08/27/19  0714   ALTSGPT 15   ASTSGOT 24   ALKPHOSPHAT 61   TBILIRUBIN 1.3   LIPASE 10*   GLUCOSE 105*         No results for input(s): NTPROBNP in the last 72 hours.      No results for input(s): TROPONINT in the last 72 hours.    Urinalysis:    No results found     Imaging:  CT-RENAL COLIC EVALUATION(A/P W/O)   Final Result      1.  Mid small bowel obstruction      2.  Small free fluid, small mesenteric fluid. No free air      Findings were discussed with MANINDER MCGEE on 8/27/2019 10:32 AM.            Assessment/Plan:  I anticipate this patient will require at least two midnights for appropriate medical management, necessitating inpatient admission.    * SBO (small bowel obstruction) (HCC)- (present on admission)  Assessment & Plan  small bowel obstruction noted on CAT scan  Dr. Zuleta, surgery, has been consulted  She will be medically managed with IV fluids, IV antiemetics, and IV pain medications.  This is approximately her eighth episode of small bowel obstruction she has required a laparotomy once during these events.  She has historically refused a NG tube and has improved without it and she refuses NG tube in the emergency room.  She will be kept n.p.o.    Essential hypertension- (present on admission)  Assessment & Plan  Continue catapres  patch    Pura-Danlos syndrome- (present on admission)  Assessment & Plan  History of        VTE prophylaxis: SCDs as she may require surgery